# Patient Record
Sex: MALE | Race: BLACK OR AFRICAN AMERICAN | NOT HISPANIC OR LATINO | URBAN - METROPOLITAN AREA
[De-identification: names, ages, dates, MRNs, and addresses within clinical notes are randomized per-mention and may not be internally consistent; named-entity substitution may affect disease eponyms.]

---

## 2024-10-25 VITALS
DIASTOLIC BLOOD PRESSURE: 66 MMHG | SYSTOLIC BLOOD PRESSURE: 129 MMHG | TEMPERATURE: 97 F | HEART RATE: 68 BPM | RESPIRATION RATE: 16 BRPM | OXYGEN SATURATION: 100 % | WEIGHT: 179.9 LBS | HEIGHT: 76 IN

## 2024-10-25 RX ORDER — SODIUM CHLORIDE 9 MG/ML
1000 INJECTION, SOLUTION INTRAMUSCULAR; INTRAVENOUS; SUBCUTANEOUS
Refills: 0 | Status: DISCONTINUED | OUTPATIENT
Start: 2024-10-28 | End: 2024-11-11

## 2024-10-25 RX ORDER — CHLORHEXIDINE GLUCONATE 40 MG/ML
1 SOLUTION TOPICAL ONCE
Refills: 0 | Status: DISCONTINUED | OUTPATIENT
Start: 2024-10-28 | End: 2024-11-11

## 2024-10-25 NOTE — H&P ADULT - NSHPLABSRESULTS_GEN_ALL_CORE
NOC RN checked patient's FSBG at 0623 which resulted as 210. Maximo COOK RN gave patient 3 units of insulin regular with a second RN Catracho. Patient's MAR does not show this documentation however. This RN will fito administration as refused so that patient does not get insulin twice.    13.2   5.33  )-----------( 284      ( 28 Oct 2024 11:00 )             37.9       10-28    139  |  107  |  12  ----------------------------<  96  3.0[L]   |  24  |  1.23    Ca    9.0      28 Oct 2024 10:59    TPro  7.3  /  Alb  4.2  /  TBili  1.2  /  DBili  x   /  AST  20  /  ALT  14  /  AlkPhos  72  10-28      PT/INR - ( 28 Oct 2024 11:00 )   PT: 13.5 sec;   INR: 1.18          PTT - ( 28 Oct 2024 11:00 )  PTT:30.8 sec    CARDIAC MARKERS ( 28 Oct 2024 10:59 )  x     / x     / x     / x     / 2.5 ng/mL    EKG: sinus chio @ 59 bpm, known 1st degree AVB (Minoo 204 ms), wide  ms, known RBBB

## 2024-10-25 NOTE — H&P ADULT - NSICDXPASTMEDICALHX_GEN_ALL_CORE_FT
PAST MEDICAL HISTORY:  History of BPH     HTN (hypertension)      PAST MEDICAL HISTORY:  History of BPH     HLD (hyperlipidemia)     HTN (hypertension)      PAST MEDICAL HISTORY:  Glaucoma     History of BPH     HLD (hyperlipidemia)     HTN (hypertension)

## 2024-10-25 NOTE — H&P ADULT - HISTORY OF PRESENT ILLNESS
Cardio: Dr. Jiang  Pharmacy:  Escort:    70 yo M with PMHx of HTN, BPH, HLD, first degree AVB who presented to his outpatient cardiologist,      __ year old male/female with PMHX of_____ . Pt presented to their outpatient cardiologist ____ complaining of (CP intermittent mild substernal chest pressure, non radiating, occurring w/ mod physical exertion, lasting a few seconds and improves w/ rest, that has been progressively worsening over the past few weeks) ALEMAN with minimal exertion only able to walk to 2-3 blocks or flights of stairs without. Patient underwent ECHO with____, Patient underwent NST/Stress-Echo with  ________ Pt denies CP/SOB, dizziness, palpitations, orthopnea/PND, leg swelling, LOC, bleeding, melena/hematochezia, fever, chills, URI symptoms, or recent illness.  In light of pts risk factors, CCS class _ anginal symptoms and abnormal NST, pt now presents to [facility] for recommended cardiac catheterization with possible intervention if clinically indicated.     Cardio: Dr. Jiang  Pharmacy:  Escort:    70 yo M with PMHx of HTN, BPH, HLD, first degree AVB who presented to his outpatient cardiologist, Dr. Jiang, for preop clearance for inguinal hernia repair surgery. Workup showed new LV dysfunction prompting further ischemic evaluation. Patient reports symptoms of _____. Pt denies CP/SOB, dizziness, palpitations, orthopnea/PND, leg swelling, LOC, bleeding, melena/hematochezia, fever, chills, URI symptoms, or recent illness.    -Echo 10/10/24: EF 35-40%, grade I DD, trace TR, prominent false tendon visualized in the left ventricle.     In light of pts risk factors, CCS class ___ anginal symptoms and abnormal echo, pt now presents to [facility] for recommended cardiac catheterization with possible intervention if clinically indicated.    Cardio: Dr. Jiang  Pharmacy: [facility] ([address])  Escort: Son    69M never smoker with PMHx of HTN, BPH, HLD, first degree AVB, RBBB, who presented to his outpatient cardiologist, Dr. Jiang, for preop clearance for inguinal hernia repair surgery. Workup showed new LV dysfunction prompting further ischemic evaluation. Patient reports symptoms of intermittent abdominal pain a/w nausea. Pt states he currently feels well. Pt denies CP/SOB, dizziness, palpitations, orthopnea/PND, leg swelling, LOC, bleeding, melena/hematochezia, fever, chills, URI symptoms, or recent illness.    - TTE (10/10/24): EF 35-40%, GIDD, trace TR, prominent false tendon visualized in the left ventricle.     In light of pts risk factors, patient now presents to [facility] for recommended cardiac catheterization for pre-operative clearance prior to inguinal hernia repair.    Cardio: Dr. Jiang  Pharmacy: [facility] ([address] [place] [address])  Escort: Son    69M never smoker with PMHx of HTN, BPH, HLD, glaucoma, first degree AVB, RBBB, who presented to his outpatient cardiologist, Dr. Jiang, for preop clearance for inguinal hernia repair surgery. Workup showed new LV dysfunction prompting further ischemic evaluation. Patient reports symptoms of intermittent abdominal pain a/w nausea. Pt states he currently feels well. Of note, pt states that he does not take his aspirin 81 mg PO qd as prescribed, and explains that the only medications he takes are for his eyes - eye drops and acetazolamide. Pt denies CP/SOB, dizziness, palpitations, orthopnea/PND, leg swelling, LOC, bleeding, melena/hematochezia, fever, chills, URI symptoms, or recent illness.    - TTE (10/10/24): EF 35-40%, GIDD, trace TR, prominent false tendon visualized in the left ventricle.     In light of pts risk factors, patient now presents to [facility] for recommended cardiac catheterization for pre-operative clearance prior to inguinal hernia repair.

## 2024-10-25 NOTE — H&P ADULT - ASSESSMENT
69M never smoker with PMHx of HTN, BPH, HLD, first degree AVB who presented to his outpatient cardiologist, Dr. Jiang, for preop clearance for inguinal hernia repair surgery. Workup showed new LV dysfunction prompting further ischemic evaluation. Patient reports symptoms of intermittent abdominal pain a/w nausea. In light of pts risk factors, patient now presents to [facility] for recommended cardiac catheterization for pre-operative clearance prior to inguinal hernia repair.     ASA III          Mallampati I  Patient is a candidate for sedation: yes  Sedation: moderate    - Load: no load given i/s/o pre-op cath at this time - to be discussed w/ IC/Fellow  - IVF: NS 50 cc/hr x 2 hours d/t low EF, euvolemic on exam, Cr 1.23 WNL (no bolus i/s/o potassium repletion)   - Informed consent obtained and placed in chart  - K 3.0 - repleted w/ potassium chloride 40 mEq PO x1 and potassium 10 mEq/100 mg IVPB q1h stop after 3 doses    Risks & benefits of procedure and alternative therapy have been explained to the patient including but not limited to: allergic reaction, bleeding w/possible need for blood transfusion, infection, renal and vascular compromise, limb damage, arrhythmia, stroke, vessel dissection/perforation, Myocardial infarction, emergent CABG.  69M never smoker with PMHx of HTN, BPH, HLD, first degree AVB who presented to his outpatient cardiologist, Dr. Jiang, for preop clearance for inguinal hernia repair surgery. Workup showed new LV dysfunction prompting further ischemic evaluation. Patient reports symptoms of intermittent abdominal pain a/w nausea. In light of pts risk factors, patient now presents to [facility] for recommended cardiac catheterization for pre-operative clearance prior to inguinal hernia repair.     ASA III          Mallampati I  Patient is a candidate for sedation: yes  Sedation: moderate    - Load: no load given i/s/o pre-op cath at this time - to be discussed w/ IC/Fellow  - IVF: NS 50 cc/hr x 2 hours d/t low EF, euvolemic on exam, Cr 1.23 WNL (no bolus i/s/o potassium repletion)   - Informed consent obtained and placed in chart  - K 3.0 - repleted w/ potassium chloride 40 mEq PO x1 and potassium 10 mEq/100 mg IVPB q1h stop after 3 doses  - Mg 2.2 - no repletion necessary    Risks & benefits of procedure and alternative therapy have been explained to the patient including but not limited to: allergic reaction, bleeding w/possible need for blood transfusion, infection, renal and vascular compromise, limb damage, arrhythmia, stroke, vessel dissection/perforation, Myocardial infarction, emergent CABG.

## 2024-10-28 ENCOUNTER — OUTPATIENT (OUTPATIENT)
Dept: OUTPATIENT SERVICES | Facility: HOSPITAL | Age: 69
LOS: 1 days | Discharge: HOME | End: 2024-10-28
Payer: MEDICARE

## 2024-10-28 DIAGNOSIS — Z98.890 OTHER SPECIFIED POSTPROCEDURAL STATES: Chronic | ICD-10-CM

## 2024-10-28 LAB
ADD ON TEST-SPECIMEN IN LAB: SIGNIFICANT CHANGE UP
ALBUMIN SERPL ELPH-MCNC: 4.2 G/DL — SIGNIFICANT CHANGE UP (ref 3.3–5)
ALP SERPL-CCNC: 72 U/L — SIGNIFICANT CHANGE UP (ref 40–120)
ALT FLD-CCNC: 14 U/L — SIGNIFICANT CHANGE UP (ref 10–45)
ANION GAP SERPL CALC-SCNC: 8 MMOL/L — SIGNIFICANT CHANGE UP (ref 5–17)
APTT BLD: 30.8 SEC — SIGNIFICANT CHANGE UP (ref 24.5–35.6)
AST SERPL-CCNC: 20 U/L — SIGNIFICANT CHANGE UP (ref 10–40)
BASOPHILS # BLD AUTO: 0.03 K/UL — SIGNIFICANT CHANGE UP (ref 0–0.2)
BASOPHILS NFR BLD AUTO: 0.6 % — SIGNIFICANT CHANGE UP (ref 0–2)
BILIRUB SERPL-MCNC: 1.2 MG/DL — SIGNIFICANT CHANGE UP (ref 0.2–1.2)
BUN SERPL-MCNC: 12 MG/DL — SIGNIFICANT CHANGE UP (ref 7–23)
CALCIUM SERPL-MCNC: 9 MG/DL — SIGNIFICANT CHANGE UP (ref 8.4–10.5)
CHLORIDE SERPL-SCNC: 107 MMOL/L — SIGNIFICANT CHANGE UP (ref 96–108)
CHOLEST SERPL-MCNC: 128 MG/DL — SIGNIFICANT CHANGE UP
CK MB CFR SERPL CALC: 2.5 NG/ML — SIGNIFICANT CHANGE UP (ref 0–6.7)
CK SERPL-CCNC: 167 U/L — SIGNIFICANT CHANGE UP (ref 30–200)
CO2 SERPL-SCNC: 24 MMOL/L — SIGNIFICANT CHANGE UP (ref 22–31)
CREAT SERPL-MCNC: 1.23 MG/DL — SIGNIFICANT CHANGE UP (ref 0.5–1.3)
EGFR: 64 ML/MIN/1.73M2 — SIGNIFICANT CHANGE UP
EOSINOPHIL # BLD AUTO: 0.32 K/UL — SIGNIFICANT CHANGE UP (ref 0–0.5)
EOSINOPHIL NFR BLD AUTO: 6 % — SIGNIFICANT CHANGE UP (ref 0–6)
GLUCOSE SERPL-MCNC: 96 MG/DL — SIGNIFICANT CHANGE UP (ref 70–99)
HCT VFR BLD CALC: 37.9 % — LOW (ref 39–50)
HDLC SERPL-MCNC: 42 MG/DL — SIGNIFICANT CHANGE UP
HGB BLD-MCNC: 13.2 G/DL — SIGNIFICANT CHANGE UP (ref 13–17)
IMM GRANULOCYTES NFR BLD AUTO: 0.4 % — SIGNIFICANT CHANGE UP (ref 0–0.9)
INR BLD: 1.18 — HIGH (ref 0.85–1.16)
LIPID PNL WITH DIRECT LDL SERPL: 69 MG/DL — SIGNIFICANT CHANGE UP
LYMPHOCYTES # BLD AUTO: 1.5 K/UL — SIGNIFICANT CHANGE UP (ref 1–3.3)
LYMPHOCYTES # BLD AUTO: 28.1 % — SIGNIFICANT CHANGE UP (ref 13–44)
MAGNESIUM SERPL-MCNC: 2.2 MG/DL — SIGNIFICANT CHANGE UP (ref 1.6–2.6)
MCHC RBC-ENTMCNC: 34.1 PG — HIGH (ref 27–34)
MCHC RBC-ENTMCNC: 34.8 GM/DL — SIGNIFICANT CHANGE UP (ref 32–36)
MCV RBC AUTO: 97.9 FL — SIGNIFICANT CHANGE UP (ref 80–100)
MONOCYTES # BLD AUTO: 0.58 K/UL — SIGNIFICANT CHANGE UP (ref 0–0.9)
MONOCYTES NFR BLD AUTO: 10.9 % — SIGNIFICANT CHANGE UP (ref 2–14)
NEUTROPHILS # BLD AUTO: 2.88 K/UL — SIGNIFICANT CHANGE UP (ref 1.8–7.4)
NEUTROPHILS NFR BLD AUTO: 54 % — SIGNIFICANT CHANGE UP (ref 43–77)
NON HDL CHOLESTEROL: 86 MG/DL — SIGNIFICANT CHANGE UP
NRBC # BLD: 0 /100 WBCS — SIGNIFICANT CHANGE UP (ref 0–0)
PLATELET # BLD AUTO: 284 K/UL — SIGNIFICANT CHANGE UP (ref 150–400)
POTASSIUM SERPL-MCNC: 3 MMOL/L — LOW (ref 3.5–5.3)
POTASSIUM SERPL-SCNC: 3 MMOL/L — LOW (ref 3.5–5.3)
PROT SERPL-MCNC: 7.3 G/DL — SIGNIFICANT CHANGE UP (ref 6–8.3)
PROTHROM AB SERPL-ACNC: 13.5 SEC — HIGH (ref 9.9–13.4)
RBC # BLD: 3.87 M/UL — LOW (ref 4.2–5.8)
RBC # FLD: 13 % — SIGNIFICANT CHANGE UP (ref 10.3–14.5)
SODIUM SERPL-SCNC: 139 MMOL/L — SIGNIFICANT CHANGE UP (ref 135–145)
TRIGL SERPL-MCNC: 90 MG/DL — SIGNIFICANT CHANGE UP
WBC # BLD: 5.33 K/UL — SIGNIFICANT CHANGE UP (ref 3.8–10.5)
WBC # FLD AUTO: 5.33 K/UL — SIGNIFICANT CHANGE UP (ref 3.8–10.5)

## 2024-10-28 PROCEDURE — 93454 CORONARY ARTERY ANGIO S&I: CPT

## 2024-10-28 PROCEDURE — 93010 ELECTROCARDIOGRAM REPORT: CPT

## 2024-10-28 PROCEDURE — 93005 ELECTROCARDIOGRAM TRACING: CPT

## 2024-10-28 PROCEDURE — 80061 LIPID PANEL: CPT

## 2024-10-28 PROCEDURE — 83735 ASSAY OF MAGNESIUM: CPT

## 2024-10-28 PROCEDURE — C1769: CPT

## 2024-10-28 PROCEDURE — 93454 CORONARY ARTERY ANGIO S&I: CPT | Mod: 26

## 2024-10-28 PROCEDURE — 99152 MOD SED SAME PHYS/QHP 5/>YRS: CPT

## 2024-10-28 PROCEDURE — 82553 CREATINE MB FRACTION: CPT

## 2024-10-28 PROCEDURE — 85025 COMPLETE CBC W/AUTO DIFF WBC: CPT

## 2024-10-28 PROCEDURE — 85730 THROMBOPLASTIN TIME PARTIAL: CPT

## 2024-10-28 PROCEDURE — 82550 ASSAY OF CK (CPK): CPT

## 2024-10-28 PROCEDURE — 80053 COMPREHEN METABOLIC PANEL: CPT

## 2024-10-28 PROCEDURE — 36415 COLL VENOUS BLD VENIPUNCTURE: CPT

## 2024-10-28 PROCEDURE — C1887: CPT

## 2024-10-28 PROCEDURE — C1894: CPT

## 2024-10-28 PROCEDURE — 85610 PROTHROMBIN TIME: CPT

## 2024-10-28 RX ORDER — DORZOLAMIDE HYDROCHLORIDE AND TIMOLOL MALEATE PRESERVATIVE FREE 20; 5 MG/ML; MG/ML
1 SOLUTION/ DROPS OPHTHALMIC
Refills: 0 | DISCHARGE

## 2024-10-28 RX ORDER — SIMVASTATIN 80 MG/1
1 TABLET, FILM COATED ORAL
Refills: 0 | DISCHARGE

## 2024-10-28 RX ORDER — LOSARTAN POTASSIUM 25 MG/1
1 TABLET ORAL
Refills: 0 | DISCHARGE

## 2024-10-28 RX ORDER — POTASSIUM CHLORIDE 10 MEQ
40 TABLET, EXTENDED RELEASE ORAL ONCE
Refills: 0 | Status: COMPLETED | OUTPATIENT
Start: 2024-10-28 | End: 2024-10-28

## 2024-10-28 RX ORDER — ACETAZOLAMIDE EXTENDED-RELEASE 500 MG/1
1 CAPSULE ORAL
Refills: 0 | DISCHARGE

## 2024-10-28 RX ORDER — SODIUM CHLORIDE 9 MG/ML
1000 INJECTION, SOLUTION INTRAMUSCULAR; INTRAVENOUS; SUBCUTANEOUS
Refills: 0 | Status: DISCONTINUED | OUTPATIENT
Start: 2024-10-28 | End: 2024-11-11

## 2024-10-28 RX ORDER — METOPROLOL TARTRATE 50 MG
1 TABLET ORAL
Refills: 0 | DISCHARGE

## 2024-10-28 RX ORDER — POTASSIUM CHLORIDE 10 MEQ
10 TABLET, EXTENDED RELEASE ORAL
Refills: 0 | Status: DISCONTINUED | OUTPATIENT
Start: 2024-10-28 | End: 2024-11-11

## 2024-10-28 RX ORDER — SPIRONOLACTONE 100 MG
1 TABLET ORAL
Qty: 30 | Refills: 2
Start: 2024-10-28 | End: 2025-01-25

## 2024-10-28 RX ORDER — ASPIRIN/MAG CARB/ALUMINUM AMIN 325 MG
1 TABLET ORAL
Refills: 0 | DISCHARGE

## 2024-10-28 RX ORDER — FINASTERIDE 5 MG/1
1 TABLET, FILM COATED ORAL
Refills: 0 | DISCHARGE

## 2024-10-28 RX ADMIN — Medication 100 MILLIEQUIVALENT(S): at 12:22

## 2024-10-28 RX ADMIN — SODIUM CHLORIDE 50 MILLILITER(S): 9 INJECTION, SOLUTION INTRAMUSCULAR; INTRAVENOUS; SUBCUTANEOUS at 11:42

## 2024-10-28 RX ADMIN — Medication 100 MILLIEQUIVALENT(S): at 13:25

## 2024-10-28 RX ADMIN — SODIUM CHLORIDE 75 MILLILITER(S): 9 INJECTION, SOLUTION INTRAMUSCULAR; INTRAVENOUS; SUBCUTANEOUS at 16:16

## 2024-10-28 RX ADMIN — Medication 40 MILLIEQUIVALENT(S): at 12:22

## 2024-10-28 NOTE — PROGRESS NOTE ADULT - SUBJECTIVE AND OBJECTIVE BOX
Interventional Cardiology PA SDA Discharge Note    Patient without complaints. Ambulated and voided without difficulties    Afebrile, VSS    Ext:    		  		        Right    Radial :  no  hematoma,   no  bleeding, dressing; C/D/I      Pulses:    intact RAD    A/P:      69M never smoker with PMHx of HTN, BPH, HLD, glaucoma, first degree AVB, RBBB, who presented to his outpatient cardiologist, Dr. Jiang, for preop clearance for inguinal hernia repair surgery. Workup showed new LV dysfunction prompting further ischemic evaluation. Patient reports symptoms of intermittent abdominal pain a/w nausea. Pt states he currently feels well. Of note, pt states that he does not take his aspirin 81 mg PO qd as prescribed, and explains that the only medications he takes are for his eyes - eye drops and acetazolamide. Pt denies CP/SOB, dizziness, palpitations, orthopnea/PND, leg swelling, LOC, bleeding, melena/hematochezia, fever, chills, URI symptoms, or recent illness.    - TTE (10/10/24): EF 35-40%, GIDD, trace TR, prominent false tendon visualized in the left ventricle.     In light of pts risk factors, patient now presents to [facility] for recommended cardiac catheterization for pre-operative clearance prior to inguinal hernia repair.       Patient is now s/p cardiac cath 10/28/24: RCA originates from left side (anamalous) otherwise MLI. no EDP. R TR.       1.	Stable for discharge as per attending Dr. Jiang after bed rest, pt voids, groin/wrist stable and 30 minutes of ambulation.  2.	Follow-up with PMD/Cardiologist Apolinar in 1-2 weeks  3.	Discharged forms signed and copies in chart    Interventional Cardiology PA SDA Discharge Note    Patient without complaints. Ambulated and voided without difficulties    Afebrile, VSS    Ext:    		  		        Right    Radial :  no  hematoma,   no  bleeding, dressing; C/D/I      Pulses:    intact RAD    A/P:      69M never smoker with PMHx of HTN, BPH, HLD, glaucoma, first degree AVB, RBBB, who presented to his outpatient cardiologist, Dr. Jiang, for preop clearance for inguinal hernia repair surgery. Workup showed new LV dysfunction prompting further ischemic evaluation. Patient reports symptoms of intermittent abdominal pain a/w nausea. Pt states he currently feels well. Of note, pt states that he does not take his aspirin 81 mg PO qd as prescribed, and explains that the only medications he takes are for his eyes - eye drops and acetazolamide. Pt denies CP/SOB, dizziness, palpitations, orthopnea/PND, leg swelling, LOC, bleeding, melena/hematochezia, fever, chills, URI symptoms, or recent illness.    - TTE (10/10/24): EF 35-40%, GIDD, trace TR, prominent false tendon visualized in the left ventricle.     In light of pts risk factors, patient now presents to [facility] for recommended cardiac catheterization for pre-operative clearance prior to inguinal hernia repair.       Patient is now s/p cardiac cath 10/28/24: RCA originates from left side (anamalous) otherwise MLI. no EDP. R TR.     Patient was advised to stop aspirin 81mg qd, continue atorvastatin 20mg qd, and START spironolactone 25mg qd (medication sent to the pharmacy)    1.	Stable for discharge as per attending Dr. Jiang after bed rest, pt voids, groin/wrist stable and 30 minutes of ambulation.  2.	Follow-up with PMD/Cardiologist Apolinar in 1-2 weeks  3.	Discharged forms signed and copies in chart

## 2024-11-01 DIAGNOSIS — I50.20 UNSPECIFIED SYSTOLIC (CONGESTIVE) HEART FAILURE: ICD-10-CM

## 2024-11-01 DIAGNOSIS — I25.10 ATHEROSCLEROTIC HEART DISEASE OF NATIVE CORONARY ARTERY WITHOUT ANGINA PECTORIS: ICD-10-CM
